# Patient Record
(demographics unavailable — no encounter records)

---

## 2024-10-17 NOTE — PROCEDURE
[Trigger point 1-2 muscle groups] : trigger point 1-2 muscle groups [Lumbar paraspinal muscle] : lumbar paraspinal muscle [Cervical paraspinal muscle] : cervical paraspinal muscle [Other: ____] : [unfilled] [Pain] : pain [Inflammation] : inflammation [Alcohol] : alcohol [Ethyl Chloride sprayed topically] : ethyl chloride sprayed topically [Sterile technique used] : sterile technique used [___ cc    1%] : Lidocaine ~Vcc of 1%  [___ cc    0.25%] : Bupivacaine (Marcaine) ~Vcc of 0.25%  [___ cc    10mg] : Triamcinolone (Kenalog) ~Vcc of 10 mg  [] : Patient tolerated procedure well [Call if redness, pain or fever occur] : call if redness, pain or fever occur [Risks, benefits, alternatives discussed / Verbal consent obtained] : the risks benefits, and alternatives have been discussed, and verbal consent was obtained

## 2024-10-17 NOTE — HISTORY OF PRESENT ILLNESS
[Neck] : neck [2] : 2 [Dull/Aching] : dull/aching [Radiating] : radiating [Sharp] : sharp [Tingling] : tingling [Intermittent] : intermittent [Household chores] : household chores [Leisure] : leisure [Sleep] : sleep [Meds] : meds [Injection therapy] : injection therapy [Sitting] : sitting [Standing] : standing [Walking] : walking [Bending forward] : bending forward [Retired] : Work status: retired [FreeTextEntry1] : LT C2-5- RFA- 09/23/2024 LT C3-C6 MBB-01/09/2024, 11/14/23   [] : no [FreeTextEntry6] : headaches , pressure [de-identified] : Driving [de-identified] : pt is following up for lower back pain the pain goes into his left leg and down it hurts him more when he is standing for too long

## 2024-10-17 NOTE — PHYSICAL EXAM
[] : diminished ROM in all planes [de-identified] : PHYSICAL EXAM  Constitutional:  Appears well, no apparent distress Ability to communicate: Normal Respiratory: non-labored breathing Skin: no rash noted Head: normocephalic, atraumatic Neck: no visible thyroid enlargement Eyes: extraocular movements intact Neurologic: alert and oriented x3 Psychiatric: normal mood, affect, and behavior   Neck: Palpation of the cervical spine is as follows: left trapezial spasm,  left trapezial tenderness, left paracervical spasm, left paracervical tenderness and right paracervical tenderness. Range of motion of the cervical spine is as follows: diminished range of motion in all planes Pain at extremes of rotation to left. Stiffness at extremes of rotation to left. Strength Testing for the cervical spine is as follows: Left Deltoid strength 5/5, Right Deltoid strength 5/5, Left Biceps 5/5, Right Biceps 5/5, Left Triceps 5/5, Right Triceps 5/5, Left Wrist Flexors 5/5, Right Wrist Flexors 5/5, Left Finger Abductors 5/5, Right Finger Abductors 5/5, Left Grasp 5/5 and Right Grasp 5/5 Neurological testing for the cervical spine is as follows: positive bilateral facet loading. light touch is intact throughout both upper extremities, normal deep tendon reflexes bilateral upper extremities, negative Spurling test and negative Arnett reflex

## 2025-01-16 NOTE — HISTORY OF PRESENT ILLNESS
[Neck] : neck [2] : 2 [Dull/Aching] : dull/aching [Radiating] : radiating [Sharp] : sharp [Tingling] : tingling [Intermittent] : intermittent [Household chores] : household chores [Leisure] : leisure [Sleep] : sleep [Meds] : meds [Injection therapy] : injection therapy [Sitting] : sitting [Standing] : standing [Walking] : walking [Bending forward] : bending forward [Retired] : Work status: retired [FreeTextEntry1] : LT C2-5- RFA- 09/23/2024 LT C3-C6 MBB-01/09/2024, 11/14/23   [] : no [FreeTextEntry6] : headaches , pressure [de-identified] : Driving [de-identified] : pt is following up for lower back pain the pain goes into his left leg and down it hurts him more when he is standing for too long

## 2025-02-27 NOTE — PHYSICAL EXAM
[de-identified] : PHYSICAL EXAM  Constitutional:  Appears well, no apparent distress Ability to communicate: Normal Respiratory: non-labored breathing Skin: no rash noted Head: normocephalic, atraumatic Neck: no visible thyroid enlargement Eyes: extraocular movements intact Neurologic: alert and oriented x3 Psychiatric: normal mood, affect, and behavior  Lumbar Spine:  Palpation: left and right lumbar paraspinal spasm and left and right lumbar paraspinal tenderness to palpation. ROM: Diminished range of motion in all plains.  Patient notes pain with lateral bending to the left and right. MMT: Motor exam is 5/5 through out bilateral lower extremities. Sensation: Light touch and pain is intact throughout bilateral lower extremities. Reflexes: achilles and patella reflexes are intact and  symmetrical.  No sustained clonus. Special Testing: Positive straight leg raise on the left and right side.  Assessemnt: Radiculopathy of lumbosacral region (M54.17) Myalgia (M79.10)

## 2025-02-27 NOTE — HISTORY OF PRESENT ILLNESS
[Neck] : neck [6] : 6 [7] : 7 [Dull/Aching] : dull/aching [Radiating] : radiating [Sharp] : sharp [Tingling] : tingling [Intermittent] : intermittent [Household chores] : household chores [Leisure] : leisure [Sleep] : sleep [Meds] : meds [Injection therapy] : injection therapy [Sitting] : sitting [Standing] : standing [Walking] : walking [Bending forward] : bending forward [Retired] : Work status: retired [FreeTextEntry1] : LT C2-5- RFA- 09/23/2024 LT C3-C6 MBB-01/09/2024, 11/14/23   [] : no [FreeTextEntry6] : headaches , pressure [de-identified] : Driving [de-identified] : pt is following up for lower back pain the pain goes into his left leg and down it hurts him more when he is standing for too long

## 2025-03-13 NOTE — DISCUSSION/SUMMARY
[de-identified] : proceed R L45 l5s1 tfesi  After discussing various treatment options with the patient including but not limited to oral medications, physical therapy, exercise, modalities as well as interventional spinal injections, we have decided with the following plan  I personally reviewed the MRI/CT scan images and agree with the radiologist's report. The radiological findings were discussed with the patient.   The risks, benefits, contents and alternatives to injection were explained in full to the patient. Risks outlined include but are not limited to infection,sepsis, bleeding, post-dural puncture headache, nerve damage, temporary increase in pain, syncopal episode, failure to resolve symptoms, allergic reaction, symptom recurrence, and elevation of blood sugar in diabetics. Cortisone may cause immunosuppression. Patient understands the risks. All questions were answered. After discussion of options, patient requested an injection. Information regarding the injection was given to the patient. Which medications to stop prior to the injection was explained to the patient as well.  Follow up in 1-2 weeks post injection for re-evaluation.   Conservative Care Continue Home exercises, stretching, activity modification, physical therapy, and conservative care.

## 2025-03-13 NOTE — ASSESSMENT
[FreeTextEntry1] : I personally reviewed the MRI/CT scan images and agree with the radiologist's report. The radiological findings were discussed with the patient

## 2025-03-13 NOTE — PHYSICAL EXAM
[de-identified] : PHYSICAL EXAM  Constitutional:  Appears well, no apparent distress Ability to communicate: Normal Respiratory: non-labored breathing Skin: no rash noted Head: normocephalic, atraumatic Neck: no visible thyroid enlargement Eyes: extraocular movements intact Neurologic: alert and oriented x3 Psychiatric: normal mood, affect, and behavior  Lumbar Spine:  Palpation: right lumbar paraspinal spasm and right lumbar paraspinal tenderness to palpation. ROM: Diminished range of motion in all plains.  Patient notes pain with lateral bending to the right. MMT: Motor exam is 5/5 through out bilateral lower extremities. Sensation: Light touch and pain is intact throughout bilateral lower extremities. Reflexes: achilles and patella reflexes are intact and  symmetrical.  No sustained clonus. Special Testing: Positive straight leg raise on the right side.  Assessemnt: Radiculopathy of lumbosacral region (M54.17) Myalgia (M79.10)  Plan: After discussing various treatment options with the patient including but not limited to oral medications, physical therapy, exercise modalities as well as interventional spinal injections, we have decided with the following plan: The patient is presenting with acute/sub-acute radicular pain with impairment in ADLs and functionality.  The pain has not responded to conservative care including NSAID therapy and/or physical therapy.  There is no bleeding tendency, unstable medical condition, or systemic infection  The risks, benefits and alternatives of the proposed procedure were explained in detail with the patient.  The risks outlined include but are not limited to infection, bleeding, post dural puncture headache, nerve injury, a temporary increase in pain, failure to resolve symptoms, allergic reaction, symptom recurrence, and possible elevation of blood sugar.  All questions were answered to patient's satisfaction and he/she verbalized an understanding.  Follow up 1-2 weeks post injection foe re-evaluation.  Continue home exercises, stretching, activity modification, physical therapy, and conservative care.

## 2025-03-13 NOTE — HISTORY OF PRESENT ILLNESS
[Neck] : neck [6] : 6 [7] : 7 [Dull/Aching] : dull/aching [Radiating] : radiating [Sharp] : sharp [Tingling] : tingling [Intermittent] : intermittent [Household chores] : household chores [Leisure] : leisure [Sleep] : sleep [Meds] : meds [Injection therapy] : injection therapy [Sitting] : sitting [Standing] : standing [Walking] : walking [Bending forward] : bending forward [Retired] : Work status: retired [FreeTextEntry1] : 3/13/25- MRI RESULTS FOLLOW UP    LT C2-5- RFA- 09/23/2024 LT C3-C6 MBB-01/09/2024, 11/14/23   [] : no [FreeTextEntry6] : headaches , pressure [de-identified] : Driving [de-identified] : pt is following up for lower back pain the pain goes into his left leg and down it hurts him more when he is standing for too long

## 2025-05-05 NOTE — PROCEDURE
[FreeTextEntry3] : Date of Service: 05/05/2025   Account: [id number]   Patient: SEKOU PETERSEN   YOB: 1963   Age: 61 year     Surgeon: Zaid Austin M.D.   Assistant: None.   Pre-Operative Diagnosis: Lumbosacral radiculitis   Post Operative Diagnosis: Lumbosacral radiculitis   Procedure:  Right L5-S1 transforaminal epidural steroid injection                       Left L5-S1 transforaminal epidural steroid injection under fluoroscopic guidance.   Anesthesia:   MAC   This procedure was carried out using fluoroscopic guidance.  The risks and benefits of the procedure were discussed extensively with the patient.  The consent of the patient was obtained and the following procedure was performed. The patient was placed in the prone position on the fluoroscopic table and the lumbar area was prepped and draped in a sterile fashion.   The left L5-S1 neural foramen was then identified on left oblique  "nathan dog" anatomical view at the 6 o' clock position using fluoroscopic guidance, and the area was marked. The overlying skin and subcutaneous structures were anesthetized using sterile technique with 1% Lidocaine.  A 22 gauge spinal needle was directed toward the inferior (6 o'clock) position of the pedicle, which formed the roof of the identified foramen.  Once in the epidural space, after negative aspiration for heme and CSF, 1cc of Omnipaque contrast was injected to confirm epidural location and assess filling defects and rule out intravascular needle placement.   The following contrast flow and epidurogram was observed: no intravascular or intrathecal flow pattern was noted.  No blood or CSF was aspirated. Omnipaque spread appeared to outline the left L5 nerve root and spread medially into the epidural space.   After this, an injectate of 3 cc preservative free normal saline plus 40 mg of kenalog was injected in the epidural space.   The right L5-S1 neural foramen was then identified on right oblique  "nathan dog" anatomical view at the 6 o' clock position using fluoroscopic guidance, and the area was marked. The overlying skin and subcutaneous structures were anesthetized using sterile technique with 1% Lidocaine.  A 22 gauge spinal needle was directed toward the inferior (6 o'clock) position of the pedicle, which formed the roof of the identified foramen.  Once in the epidural space, after negative aspiration for heme and CSF, 1cc of Omnipaque contrast was injected to confirm epidural location and assess filling defects and rule out intravascular needle placement.   The following contrast flow and epidurogram was observed: no intravascular or intrathecal flow pattern was noted.  No blood or CSF was aspirated. Omnipaque spread appeared to outline the right L5 nerve root and spread medially into the epidural space.   After this, an injectate of 3 cc preservative free normal saline plus 40 mg of kenalog was injected in the epidural space. The needle was subsequently removed.  Vital signs remained normal.  Pulse oximeter was used throughout the procedure and the patient's pulse and oxygen saturation remained within normal limits.  The patient tolerated the procedure well.  There were no complications.  The patient was instructed to apply ice over the injection sites for twenty minutes every two hours for the next 24 to 48 hours.  The patient was also instructed to contact me immediately if there were any problems.     Zaid Austin M.D.

## 2025-05-22 NOTE — DISCUSSION/SUMMARY
[de-identified] : Pt. presents with 70% Relief of pain and improvement in ROM and ADLS post injection.  Able to sit, stand, walk, sleep for longer periods of time.  will repeat bl l5-s1 in 3 months from cj for cumulative relief as needed

## 2025-05-22 NOTE — HISTORY OF PRESENT ILLNESS
[Neck] : neck [Dull/Aching] : dull/aching [Radiating] : radiating [Sharp] : sharp [Tingling] : tingling [Intermittent] : intermittent [Household chores] : household chores [Leisure] : leisure [Sleep] : sleep [Meds] : meds [Injection therapy] : injection therapy [Sitting] : sitting [Standing] : standing [Walking] : walking [Bending forward] : bending forward [Retired] : Work status: retired [6] : 6 Warm/Dry [FreeTextEntry1] : B/L L5-S1 TFESI-5/5/25 __________________________________  3/13/25- MRI RESULTS FOLLOW UP  ____________________________________  LT C2-5- RFA- 09/23/2024 LT C3-C6 MBB-01/09/2024, 11/14/23   [] : no [FreeTextEntry6] : headaches , pressure [de-identified] : Driving, weather  [de-identified] : pt is following up for lower back pain the pain goes into his left leg and down it hurts him more when he is standing for too long

## 2025-05-22 NOTE — PHYSICAL EXAM
[de-identified] : PHYSICAL EXAM  Constitutional:  Appears well, no apparent distress Ability to communicate: Normal Respiratory: non-labored breathing Skin: no rash noted Head: normocephalic, atraumatic Neck: no visible thyroid enlargement Eyes: extraocular movements intact Neurologic: alert and oriented x3 Psychiatric: normal mood, affect, and behavior  Lumbar Spine:  Palpation: left and right lumbar paraspinal spasm and left and right lumbar paraspinal tenderness to palpation. ROM: Diminished range of motion in all plains.  Patient notes pain with lateral bending to the left and right. MMT: Motor exam is 5/5 through out bilateral lower extremities. Sensation: Light touch and pain is intact throughout bilateral lower extremities. Reflexes: achilles and patella reflexes are intact and  symmetrical.  No sustained clonus. Special Testing: Positive straight leg raise on the left and right side.  Assessemnt: Radiculopathy of lumbosacral region (M54.17) Myalgia (M79.10)  Plan: After discussing various treatment options with the patient including but not limited to oral medications, physical therapy, exercise modalities as well as interventional spinal injections, we have decided with the following plan: The patient is presenting with acute/sub-acute radicular pain with impairment in ADLs and functionality.  The pain has not responded to conservative care including NSAID therapy and/or physical therapy.  There is no bleeding tendency, unstable medical condition, or systemic infection  The risks, benefits and alternatives of the proposed procedure were explained in detail with the patient.  The risks outlined include but are not limited to infection, bleeding, post dural puncture headache, nerve injury, a temporary increase in pain, failure to resolve symptoms, allergic reaction, symptom recurrence, and possible elevation of blood sugar.  All questions were answered to patient's satisfaction and he/she verbalized an understanding.  Follow up 1-2 weeks post injection foe re-evaluation.  Continue home exercises, stretching, activity modification, physical therapy, and conservative care.

## 2025-07-08 NOTE — PHYSICAL EXAM
[de-identified] : PHYSICAL EXAM  Constitutional:  Appears well, no apparent distress Ability to communicate: Normal Respiratory: non-labored breathing Skin: no rash noted Head: normocephalic, atraumatic Neck: no visible thyroid enlargement Eyes: extraocular movements intact Neurologic: alert and oriented x3 Psychiatric: normal mood, affect, and behavior  Lumbar Spine:  Palpation: left and right lumbar paraspinal spasm and left and right lumbar paraspinal tenderness to palpation. ROM: Diminished range of motion in all plains.  Patient notes pain with lateral bending to the left and right. MMT: Motor exam is 5/5 through out bilateral lower extremities. Sensation: Light touch and pain is intact throughout bilateral lower extremities. Reflexes: achilles and patella reflexes are intact and  symmetrical.  No sustained clonus. Special Testing: Positive straight leg raise on the left and right side.

## 2025-07-08 NOTE — HISTORY OF PRESENT ILLNESS
[Neck] : neck [6] : 6 [Dull/Aching] : dull/aching [Radiating] : radiating [Sharp] : sharp [Tingling] : tingling [Intermittent] : intermittent [Household chores] : household chores [Leisure] : leisure [Sleep] : sleep [Meds] : meds [Injection therapy] : injection therapy [Sitting] : sitting [Standing] : standing [Walking] : walking [Bending forward] : bending forward [Retired] : Work status: retired [FreeTextEntry1] : B/L L5-S1 TFESI-5/5/25 __________________________________  3/13/25- MRI RESULTS FOLLOW UP  ____________________________________  LT C2-5- RFA- 09/23/2024 LT C3-C6 MBB-01/09/2024, 11/14/23   [] : no [FreeTextEntry6] : headaches , pressure [de-identified] : Driving, weather  [de-identified] : pt is following up for lower back pain the pain goes into his left leg and down it hurts him more when he is standing for too long

## 2025-07-08 NOTE — ASSESSMENT
[FreeTextEntry1] : symptoms exam and imaging findings all correlates;  has not seen sustained relief with nonoperative care;  did have stretches of good symptom control but that is no longer the case;  due to the structural pathology there is a role for surgery;  63 y/o M with chronic lower back pain and intermittent B/LE radic x years. MRI: L4-5 listhesis with superimposed right paracentral disc extrusion with moderate R>L foraminal stenosis & spinal stenosis. Received multiple LESI's with pain mgmt & tried PT + meds w/o sustained relief.   - Reviewed surgical options: plan for a L4-5 XLIF.  - Richardson agrees with plan and would like to pursue surgery around September.    The risks and benefits of surgery have been discussed. Risks include but are not limited to bleeding, infection, reaction to anesthesia, injury to blood vessels and nerves, malunion, nonunion, DVT, PE, necessity of repeat surgery, CF leak/repair, chronic pain, loss of limb and death. The patient understands the risks and agrees with the surgical plan. All questions have been answered.

## 2025-07-08 NOTE — HISTORY OF PRESENT ILLNESS
[Lower back] : lower back [4] : 4 [Dull/Aching] : dull/aching [Household chores] : household chores [Gradual] : gradual [3] : 3 [Shooting] : shooting [Constant] : constant [Walking/activity] : walking/activity [Lying in bed] : lying in bed [Retired] : Work status: retired [de-identified] : 7/8/25: Follow up L Spine. Since last seen 3 years ago has been under the care with pain mgmt, received b/l L5-S1 TFESI with transient relief, most recent one on 5/5/25. Sxs worsening as of late, intermittently radiates into LE.   7/7/2022: pain radiates into the buttock L>R at times down the back of the left leg down to the ankle. has been having LESI with dr. humphreys over the last 3 years. last inj was 12/2021 which has provided relief. walking and standing aggravates. no prior back surgeries. planned to have L TKA with dr maldonado in Sept. taking lyrica for nerve pain, mobic prn. tramadol prn for severe pain.  [] : no [FreeTextEntry5] : pt states he has been experiencing pain on his back for a while and he has been getting injection last December  [FreeTextEntry7] : [facility]

## 2025-07-08 NOTE — PROCEDURE
[FreeTextEntry3] : Trigger Point was performed because of pain inflammation Anesthesia: ethyl chloride sprayed topically.: Lidocaine .1% 3 cc Marcaine:.25% 3cc  kenalog 10mg/cc 2cc :Needle size: 25 gauge 1 1/2inch.  Medication was injected in the right and left Lumbar paraspinal muscles . Patient has tried OTC's including aspirin, Ibuprofen, Aleve etc or prescription NSAIDS, and/or exercises at home and/ or physical therapy without satisfactory response After verbal consent using sterile preparation and technique.The risks, benefits, and alternatives to cortisone injection were explained in full to the patient. Risks outlined include but are not limited to infection, sepsis, bleeding, scarring, skin discoloration, temporary increase in pain, syncopal episode, failure to resolve symptoms, allergic reaction, symptom recurrence, and elevation of blood sugar in diabetics. Patient understood the risks. All questions were answered. After discussion of options, patient requested an injection. Oral informed consent was obtained and sterile prep was done of the injection site. Sterile technique was utilized for the procedure including the preparation of the solutions used for the injection. Patient tolerated the procedure well. Advised to ice the injection site this evening.  Sterile technique used Prep with alcohol locally to site.

## 2025-07-08 NOTE — PHYSICAL EXAM
[de-identified] : PHYSICAL EXAM  Constitutional:  Appears well, no apparent distress Ability to communicate: Normal Respiratory: non-labored breathing Skin: no rash noted Head: normocephalic, atraumatic Neck: no visible thyroid enlargement Eyes: extraocular movements intact Neurologic: alert and oriented x3 Psychiatric: normal mood, affect, and behavior  Lumbar Spine:  Palpation: left and right lumbar paraspinal spasm and left and right lumbar paraspinal tenderness to palpation. ROM: Diminished range of motion in all plains.  Patient notes pain with lateral bending to the left and right. MMT: Motor exam is 5/5 through out bilateral lower extremities. Sensation: Light touch and pain is intact throughout bilateral lower extremities. Reflexes: achilles and patella reflexes are intact and  symmetrical.  No sustained clonus. Special Testing: Positive straight leg raise on the left and right side.

## 2025-07-08 NOTE — HISTORY OF PRESENT ILLNESS
[Neck] : neck [6] : 6 [Dull/Aching] : dull/aching [Radiating] : radiating [Sharp] : sharp [Tingling] : tingling [Intermittent] : intermittent [Household chores] : household chores [Leisure] : leisure [Sleep] : sleep [Meds] : meds [Injection therapy] : injection therapy [Sitting] : sitting [Standing] : standing [Walking] : walking [Bending forward] : bending forward [Retired] : Work status: retired [FreeTextEntry1] : B/L L5-S1 TFESI-5/5/25 __________________________________  3/13/25- MRI RESULTS FOLLOW UP  ____________________________________  LT C2-5- RFA- 09/23/2024 LT C3-C6 MBB-01/09/2024, 11/14/23   [] : no [FreeTextEntry6] : headaches , pressure [de-identified] : Driving, weather  [de-identified] : pt is following up for lower back pain the pain goes into his left leg and down it hurts him more when he is standing for too long

## 2025-07-08 NOTE — PHYSICAL EXAM
[Flexion] : flexion [Extension] : extension [Left lower extremity below knee] : left lower extremity below knee [Right lower extremity below knee] : right lower extremity below knee [] : mildly antalgic [de-identified] : bilateral quad and ankle DF 4/5;  remainder is 5/5 [de-identified] : radicular leg pain with lumbar extension; L45 dermatomes of pain, bilateral